# Patient Record
Sex: MALE | Employment: OTHER | URBAN - METROPOLITAN AREA
[De-identification: names, ages, dates, MRNs, and addresses within clinical notes are randomized per-mention and may not be internally consistent; named-entity substitution may affect disease eponyms.]

---

## 2024-10-31 ENCOUNTER — OFFICE VISIT (OUTPATIENT)
Dept: SURGERY | Age: 74
End: 2024-10-31
Payer: MEDICARE

## 2024-10-31 ENCOUNTER — PREP FOR PROCEDURE (OUTPATIENT)
Dept: SURGERY | Age: 74
End: 2024-10-31

## 2024-10-31 VITALS
DIASTOLIC BLOOD PRESSURE: 79 MMHG | HEART RATE: 66 BPM | HEIGHT: 68 IN | BODY MASS INDEX: 25.76 KG/M2 | WEIGHT: 170 LBS | SYSTOLIC BLOOD PRESSURE: 134 MMHG

## 2024-10-31 DIAGNOSIS — L72.3 SEBACEOUS CYST: Primary | ICD-10-CM

## 2024-10-31 DIAGNOSIS — L72.3 SEBACEOUS CYST: ICD-10-CM

## 2024-10-31 PROCEDURE — 1159F MED LIST DOCD IN RCRD: CPT | Performed by: SURGERY

## 2024-10-31 PROCEDURE — G8484 FLU IMMUNIZE NO ADMIN: HCPCS | Performed by: SURGERY

## 2024-10-31 PROCEDURE — 1123F ACP DISCUSS/DSCN MKR DOCD: CPT | Performed by: SURGERY

## 2024-10-31 PROCEDURE — G8419 CALC BMI OUT NRM PARAM NOF/U: HCPCS | Performed by: SURGERY

## 2024-10-31 PROCEDURE — G8427 DOCREV CUR MEDS BY ELIG CLIN: HCPCS | Performed by: SURGERY

## 2024-10-31 PROCEDURE — 99203 OFFICE O/P NEW LOW 30 MIN: CPT | Performed by: SURGERY

## 2024-10-31 PROCEDURE — 1036F TOBACCO NON-USER: CPT | Performed by: SURGERY

## 2024-10-31 PROCEDURE — 3017F COLORECTAL CA SCREEN DOC REV: CPT | Performed by: SURGERY

## 2024-10-31 RX ORDER — METOPROLOL SUCCINATE 25 MG/1
75 TABLET, EXTENDED RELEASE ORAL DAILY
COMMUNITY
Start: 2024-04-03

## 2024-10-31 RX ORDER — ALLOPURINOL 300 MG/1
300 TABLET ORAL DAILY
COMMUNITY
Start: 2024-05-28

## 2024-10-31 RX ORDER — BENZONATATE 200 MG/1
CAPSULE ORAL
COMMUNITY
Start: 2024-07-26

## 2024-10-31 RX ORDER — CETIRIZINE HYDROCHLORIDE 10 MG/1
10 TABLET ORAL DAILY
COMMUNITY
Start: 2024-04-04 | End: 2025-04-04

## 2024-10-31 RX ORDER — AMLODIPINE BESYLATE 10 MG/1
10 TABLET ORAL DAILY
COMMUNITY
Start: 2024-01-17 | End: 2025-01-16

## 2024-10-31 RX ORDER — ASPIRIN 81 MG/1
81 TABLET, CHEWABLE ORAL DAILY
COMMUNITY

## 2024-10-31 RX ORDER — POTASSIUM CHLORIDE 750 MG/1
20 TABLET, EXTENDED RELEASE ORAL DAILY
COMMUNITY
Start: 2024-10-15

## 2024-10-31 RX ORDER — ATORVASTATIN CALCIUM 40 MG/1
40 TABLET, FILM COATED ORAL DAILY
COMMUNITY
Start: 2024-04-03

## 2024-10-31 RX ORDER — OMEPRAZOLE 40 MG/1
40 CAPSULE, DELAYED RELEASE ORAL DAILY
COMMUNITY
Start: 2024-01-17

## 2024-10-31 RX ORDER — LOSARTAN POTASSIUM 100 MG/1
100 TABLET ORAL DAILY
COMMUNITY
Start: 2024-01-17

## 2024-10-31 NOTE — H&P (VIEW-ONLY)
headaches, or seizures.    Psychiatric: No vegetative symptoms or depression.    Endocrine: No heat or cold intolerance, no polyuria or polydipsia.    Hematologic/Lymphatic: No lymphadenopathy, easy bruising or bleeding.    The remainder of the review of symptoms was negative, except as per hpi.    The following portions of the patient's history were reviewed and updated as appropriate: allergies, current medications, past family history, past medical history, past social history, past surgical history and problem list.    PAST MEDICAL HISTORY:    No past medical history on file.    PAST SURGICAL HISTORY:    Past Surgical History:   Procedure Laterality Date    HAND SURGERY      HERNIA REPAIR      PROSTATE SURGERY      SKIN CANCER EXCISION      TONSILLECTOMY         FAMILY HISTORY:    No family history on file.    SOCIAL HISTORY:    Social History     Socioeconomic History    Marital status:      Spouse name: Not on file    Number of children: Not on file    Years of education: Not on file    Highest education level: Not on file   Occupational History    Not on file   Tobacco Use    Smoking status: Never    Smokeless tobacco: Never   Substance and Sexual Activity    Alcohol use: Not on file    Drug use: Not on file    Sexual activity: Not on file   Other Topics Concern    Not on file   Social History Narrative    Not on file     Social Determinants of Health     Financial Resource Strain: Not on file   Food Insecurity: No Food Insecurity (10/2/2024)    Received from [facility]    Hunger Vital Sign     Worried About Running Out of Food in the Last Year: Never true     Ran Out of Food in the Last Year: Never true   Transportation Needs: No Transportation Needs (6/30/2021)    Received from [facility]    PRAPARE - Transportation     Lack of Transportation (Medical): No     Lack of Transportation (Non-Medical): No   Physical Activity: Insufficiently Active (1/22/2024)    Received from [employer]    Physical Activity

## 2024-10-31 NOTE — PROGRESS NOTES
GENERAL SURGERY NEW PATIENT NOTE    PRIMARY CARE PROVIDER:Kalia Patel MD    REFERRING PHYSICIAN: Kellie Gould    Dear Dr. Gould,    I had the pleasure of seeing your patient, Nawaf Baez, in the General Surgery Clinic at [facility]. My findings and recommendations are as follows:    Date of visit: 10/31/24    CHIEF COMPLAINT: Lipoma of forehead    HISTORY OF PRESENT ILLNESS: Nawaf Baez is a 74 y.o. male with history of HTN, HLD, GERD, gout, basal cell skin cancer on forehead status post excision referred to me for evaluation of forehead lipoma.    Patient reports that he first noticed a small lump over his forehead approximately 3 to 4 weeks after he had his basal cell skin cancer removed over his forehead approximately 1 year ago.  He states that the lump occurred near where his prior incision was.  He states that it did not bother him at that time and did not cause any pain, however it is slowly grown over time.  He does state that it is bothersome to him now given its location and size.  However, he states that he does not have any significant pain or drainage with the mass.  Denies any redness over the mass.  Denies any fevers, chills, chest pain, shortness of breath, dizziness, lightheadedness, nausea, vomiting, diarrhea, unintentional weight loss, or night sweats.        Prior Abdominal Surgery: basal cell skin cancer excision, prostatectomy, bilateral inguinal hernia repair  Cardiac History: HTN  Pulmonary History: None  Blood Thinners: [id number]        REVIEW OF SYSTEMS:    Constitutional: no weight loss, no fevers or night sweats    Eyes: No pain, redness or visual changes.    Ears, Nose, Mouth: No oral or nasal ulcers.    Cardiovascular: No recent chest pain, irregular heartbeat, or dizziness.    Respiratory: No cough, pleuritic pain, or dyspnea on exertion    Gastrointestinal: as above.    Genitourinary: No dysuria, hematuria    Neurological: No new weakness, numbness,

## 2024-11-05 RX ORDER — SILDENAFIL CITRATE 20 MG/1
20 TABLET ORAL DAILY
COMMUNITY
Start: 2024-03-04

## 2024-11-05 RX ORDER — METHYLPREDNISOLONE 4 MG
500 TABLET, DOSE PACK ORAL NIGHTLY
COMMUNITY

## 2024-11-05 RX ORDER — ESCITALOPRAM OXALATE 10 MG/1
10 TABLET ORAL DAILY
COMMUNITY

## 2024-11-05 RX ORDER — ASPIRIN 81 MG
1 TABLET, DELAYED RELEASE (ENTERIC COATED) ORAL EVERY MORNING
COMMUNITY

## 2024-11-05 RX ORDER — CHLORTHALIDONE 25 MG/1
25 TABLET ORAL DAILY
COMMUNITY
Start: 2024-10-30

## 2024-11-05 NOTE — PERIOP NOTE
Patient verified name and .  Order for consent NOT found in EHR at time of PAT visit. Unable to verify case posting against order; surgery verified by patient.    Type 1B surgery, PAT phone assessment complete.  Orders not received.  Labs per surgeon: unknown; no orders received    Labs per anesthesia protocol: POC K+ DOS    Patient answered medical/surgical history questions at their best of ability. All prior to admission medications documented in EPIC.    Patient instructed to continue taking all prescription medications up to the day of surgery but to take only the following medications the day of surgery according to anesthesia guidelines with a small sip of water: aspirin 81 mg, Lexapro, Zyrtec, amlodipine, allopurinol, omeprazole. Patient states he takes metoprolol at night. Also, patient is requested to take 2 Tylenol in the morning and then again before bed on the day before surgery. Regular or extra strength may be used.       Patient informed that all vitamins and supplements should be held 7 days prior to surgery and NSAIDS 5 days prior to surgery. Prescription meds to hold:vitamins and supplements 7 days, diclofenac gel 5 days. Do not take chlorthalidone, losartan on the morning of procedure. Hold sildenafil 24 hours PTS (per Dr. Ford).    Patient instructed on the following:    > Arrive at Main Entrance, time of arrival to be called the day before by 1700  > NPO after midnight, unless otherwise indicated, including gum, mints, and ice chips.Per anesthesiology instructions, please drink 32 oz of water 2 hours prior to arrival to prevent dehydration.    > Responsible adult must drive patient to the hospital, stay during surgery, and patient will need supervision 24 hours after anesthesia  > Use non moisturizing soap in shower the night before surgery and on the morning of surgery  > All piercings must be removed prior to arrival.    > Leave all valuables (money and jewelry) at home but bring

## 2024-11-17 ENCOUNTER — ANESTHESIA EVENT (OUTPATIENT)
Dept: SURGERY | Age: 74
End: 2024-11-17
Payer: MEDICARE

## 2024-11-17 RX ORDER — HYDROMORPHONE HYDROCHLORIDE 2 MG/ML
0.5 INJECTION, SOLUTION INTRAMUSCULAR; INTRAVENOUS; SUBCUTANEOUS EVERY 10 MIN PRN
Status: CANCELLED | OUTPATIENT
Start: 2024-11-17

## 2024-11-17 RX ORDER — FENTANYL CITRATE 50 UG/ML
50 INJECTION, SOLUTION INTRAMUSCULAR; INTRAVENOUS EVERY 5 MIN PRN
Status: CANCELLED | OUTPATIENT
Start: 2024-11-17

## 2024-11-17 RX ORDER — HALOPERIDOL 5 MG/ML
1 INJECTION INTRAMUSCULAR
Status: CANCELLED | OUTPATIENT
Start: 2024-11-17 | End: 2024-11-18

## 2024-11-17 RX ORDER — OXYCODONE HYDROCHLORIDE 5 MG/1
5 TABLET ORAL
Status: CANCELLED | OUTPATIENT
Start: 2024-11-17 | End: 2024-11-18

## 2024-11-17 RX ORDER — SODIUM CHLORIDE 0.9 % (FLUSH) 0.9 %
5-40 SYRINGE (ML) INJECTION EVERY 12 HOURS SCHEDULED
Status: CANCELLED | OUTPATIENT
Start: 2024-11-17

## 2024-11-17 RX ORDER — NALOXONE HYDROCHLORIDE 0.4 MG/ML
INJECTION, SOLUTION INTRAMUSCULAR; INTRAVENOUS; SUBCUTANEOUS PRN
Status: CANCELLED | OUTPATIENT
Start: 2024-11-17

## 2024-11-17 RX ORDER — SODIUM CHLORIDE 0.9 % (FLUSH) 0.9 %
5-40 SYRINGE (ML) INJECTION PRN
Status: CANCELLED | OUTPATIENT
Start: 2024-11-17

## 2024-11-17 RX ORDER — IPRATROPIUM BROMIDE AND ALBUTEROL SULFATE 2.5; .5 MG/3ML; MG/3ML
1 SOLUTION RESPIRATORY (INHALATION)
Status: CANCELLED | OUTPATIENT
Start: 2024-11-17 | End: 2024-11-18

## 2024-11-17 RX ORDER — ONDANSETRON 2 MG/ML
4 INJECTION INTRAMUSCULAR; INTRAVENOUS
Status: CANCELLED | OUTPATIENT
Start: 2024-11-17 | End: 2024-11-18

## 2024-11-18 ENCOUNTER — HOSPITAL ENCOUNTER (OUTPATIENT)
Age: 74
Setting detail: OUTPATIENT SURGERY
Discharge: HOME | End: 2024-11-18
Attending: SURGERY | Admitting: SURGERY
Payer: MEDICARE

## 2024-11-18 ENCOUNTER — ANESTHESIA (OUTPATIENT)
Dept: SURGERY | Age: 74
End: 2024-11-18
Payer: MEDICARE

## 2024-11-18 VITALS
DIASTOLIC BLOOD PRESSURE: 75 MMHG | RESPIRATION RATE: 11 BRPM | SYSTOLIC BLOOD PRESSURE: 150 MMHG | TEMPERATURE: 98 F | BODY MASS INDEX: 26.7 KG/M2 | OXYGEN SATURATION: 96 % | HEIGHT: 68 IN | HEART RATE: 57 BPM | WEIGHT: 176.2 LBS

## 2024-11-18 LAB — POTASSIUM BLD-SCNC: 3 MMOL/L (ref 3.5–5.1)

## 2024-11-18 PROCEDURE — 6360000002 HC RX W HCPCS: Performed by: SURGERY

## 2024-11-18 PROCEDURE — 2580000003 HC RX 258: Performed by: SURGERY

## 2024-11-18 PROCEDURE — 88304 TISSUE EXAM BY PATHOLOGIST: CPT

## 2024-11-18 PROCEDURE — 3700000000 HC ANESTHESIA ATTENDED CARE: Performed by: SURGERY

## 2024-11-18 PROCEDURE — 2580000003 HC RX 258: Performed by: STUDENT IN AN ORGANIZED HEALTH CARE EDUCATION/TRAINING PROGRAM

## 2024-11-18 PROCEDURE — 2709999900 HC NON-CHARGEABLE SUPPLY: Performed by: SURGERY

## 2024-11-18 PROCEDURE — 7100000000 HC PACU RECOVERY - FIRST 15 MIN: Performed by: SURGERY

## 2024-11-18 PROCEDURE — 2500000003 HC RX 250 WO HCPCS: Performed by: SURGERY

## 2024-11-18 PROCEDURE — 84132 ASSAY OF SERUM POTASSIUM: CPT

## 2024-11-18 PROCEDURE — 7100000011 HC PHASE II RECOVERY - ADDTL 15 MIN: Performed by: SURGERY

## 2024-11-18 PROCEDURE — 6360000002 HC RX W HCPCS: Performed by: STUDENT IN AN ORGANIZED HEALTH CARE EDUCATION/TRAINING PROGRAM

## 2024-11-18 PROCEDURE — 7100000001 HC PACU RECOVERY - ADDTL 15 MIN: Performed by: SURGERY

## 2024-11-18 PROCEDURE — 3700000001 HC ADD 15 MINUTES (ANESTHESIA): Performed by: SURGERY

## 2024-11-18 PROCEDURE — 2500000003 HC RX 250 WO HCPCS: Performed by: STUDENT IN AN ORGANIZED HEALTH CARE EDUCATION/TRAINING PROGRAM

## 2024-11-18 PROCEDURE — 7100000010 HC PHASE II RECOVERY - FIRST 15 MIN: Performed by: SURGERY

## 2024-11-18 PROCEDURE — 3600000002 HC SURGERY LEVEL 2 BASE: Performed by: SURGERY

## 2024-11-18 PROCEDURE — 3600000012 HC SURGERY LEVEL 2 ADDTL 15MIN: Performed by: SURGERY

## 2024-11-18 RX ORDER — ACETAMINOPHEN 500 MG
1000 TABLET ORAL ONCE
Status: DISCONTINUED | OUTPATIENT
Start: 2024-11-18 | End: 2024-11-18 | Stop reason: HOSPADM

## 2024-11-18 RX ORDER — LIDOCAINE HYDROCHLORIDE 20 MG/ML
INJECTION, SOLUTION EPIDURAL; INFILTRATION; INTRACAUDAL; PERINEURAL
Status: DISCONTINUED | OUTPATIENT
Start: 2024-11-18 | End: 2024-11-18 | Stop reason: SDUPTHER

## 2024-11-18 RX ORDER — SODIUM CHLORIDE 0.9 % (FLUSH) 0.9 %
SYRINGE (ML) INJECTION
Status: DISCONTINUED | OUTPATIENT
Start: 2024-11-18 | End: 2024-11-18 | Stop reason: SDUPTHER

## 2024-11-18 RX ORDER — LIDOCAINE HYDROCHLORIDE 10 MG/ML
1 INJECTION, SOLUTION INFILTRATION; PERINEURAL
Status: DISCONTINUED | OUTPATIENT
Start: 2024-11-18 | End: 2024-11-18 | Stop reason: HOSPADM

## 2024-11-18 RX ORDER — ACETAMINOPHEN 500 MG
500 TABLET ORAL 4 TIMES DAILY PRN
Qty: 360 TABLET | Refills: 1 | Status: SHIPPED | OUTPATIENT
Start: 2024-11-18

## 2024-11-18 RX ORDER — SODIUM CHLORIDE 0.9 % (FLUSH) 0.9 %
5-40 SYRINGE (ML) INJECTION EVERY 12 HOURS SCHEDULED
Status: DISCONTINUED | OUTPATIENT
Start: 2024-11-18 | End: 2024-11-18 | Stop reason: HOSPADM

## 2024-11-18 RX ORDER — BUPIVACAINE HYDROCHLORIDE AND EPINEPHRINE 5; 5 MG/ML; UG/ML
INJECTION, SOLUTION EPIDURAL; INTRACAUDAL; PERINEURAL PRN
Status: DISCONTINUED | OUTPATIENT
Start: 2024-11-18 | End: 2024-11-18 | Stop reason: ALTCHOICE

## 2024-11-18 RX ORDER — MIDAZOLAM HYDROCHLORIDE 2 MG/2ML
2 INJECTION, SOLUTION INTRAMUSCULAR; INTRAVENOUS
Status: DISCONTINUED | OUTPATIENT
Start: 2024-11-18 | End: 2024-11-18 | Stop reason: HOSPADM

## 2024-11-18 RX ORDER — SODIUM CHLORIDE, SODIUM LACTATE, POTASSIUM CHLORIDE, CALCIUM CHLORIDE 600; 310; 30; 20 MG/100ML; MG/100ML; MG/100ML; MG/100ML
INJECTION, SOLUTION INTRAVENOUS CONTINUOUS
Status: DISCONTINUED | OUTPATIENT
Start: 2024-11-18 | End: 2024-11-18 | Stop reason: HOSPADM

## 2024-11-18 RX ORDER — SODIUM CHLORIDE 0.9 % (FLUSH) 0.9 %
5-40 SYRINGE (ML) INJECTION PRN
Status: DISCONTINUED | OUTPATIENT
Start: 2024-11-18 | End: 2024-11-18 | Stop reason: HOSPADM

## 2024-11-18 RX ORDER — PROPOFOL 10 MG/ML
INJECTION, EMULSION INTRAVENOUS
Status: DISCONTINUED | OUTPATIENT
Start: 2024-11-18 | End: 2024-11-18 | Stop reason: SDUPTHER

## 2024-11-18 RX ADMIN — SODIUM CHLORIDE, PRESERVATIVE FREE 20 ML: 5 INJECTION INTRAVENOUS at 09:34

## 2024-11-18 RX ADMIN — PROPOFOL 120 MCG/KG/MIN: 10 INJECTION, EMULSION INTRAVENOUS at 09:35

## 2024-11-18 RX ADMIN — CEFAZOLIN 2000 MG: 2 INJECTION, POWDER, FOR SOLUTION INTRAMUSCULAR; INTRAVENOUS at 09:36

## 2024-11-18 RX ADMIN — SODIUM CHLORIDE, PRESERVATIVE FREE 10 ML: 5 INJECTION INTRAVENOUS at 10:05

## 2024-11-18 RX ADMIN — SODIUM CHLORIDE, PRESERVATIVE FREE 10 ML: 5 INJECTION INTRAVENOUS at 09:36

## 2024-11-18 RX ADMIN — LIDOCAINE HYDROCHLORIDE 40 MG: 20 INJECTION, SOLUTION EPIDURAL; INFILTRATION; INTRACAUDAL; PERINEURAL at 09:48

## 2024-11-18 RX ADMIN — PROPOFOL 50 MG: 10 INJECTION, EMULSION INTRAVENOUS at 09:34

## 2024-11-18 RX ADMIN — LIDOCAINE HYDROCHLORIDE 60 MG: 20 INJECTION, SOLUTION EPIDURAL; INFILTRATION; INTRACAUDAL; PERINEURAL at 09:34

## 2024-11-18 RX ADMIN — PROPOFOL 20 MG: 10 INJECTION, EMULSION INTRAVENOUS at 09:48

## 2024-11-18 ASSESSMENT — PAIN - FUNCTIONAL ASSESSMENT
PAIN_FUNCTIONAL_ASSESSMENT: NONE - DENIES PAIN
PAIN_FUNCTIONAL_ASSESSMENT: 0-10
PAIN_FUNCTIONAL_ASSESSMENT: NONE - DENIES PAIN
PAIN_FUNCTIONAL_ASSESSMENT: NONE - DENIES PAIN

## 2024-11-18 NOTE — ANESTHESIA PRE PROCEDURE
tablet by mouth daily   Yes Provider, MD Roel   diclofenac sodium (VOLTAREN) 1 % GEL Apply topically as needed    Provider, MD Roel       Current medications:    Current Facility-Administered Medications   Medication Dose Route Frequency Provider Last Rate Last Admin   • lidocaine 1 % injection 1 mL  1 mL IntraDERmal Once PRN Moe Guzman MD       • acetaminophen (TYLENOL) tablet 1,000 mg  1,000 mg Oral Once Moe Guzman MD       • famotidine (PEPCID) 20 mg in sodium chloride (PF) 0.9 % 10 mL injection  20 mg IntraVENous Once PRN Moe Guzman MD       • lactated ringers infusion   IntraVENous Continuous Moe Guzman MD       • sodium chloride flush 0.9 % injection 5-40 mL  5-40 mL IntraVENous 2 times per day Moe Guzman MD       • sodium chloride flush 0.9 % injection 5-40 mL  5-40 mL IntraVENous PRN Moe Guzman MD       • midazolam PF (VERSED) injection 2 mg  2 mg IntraVENous Once PRN Moe Guzman MD           Allergies:    Allergies   Allergen Reactions   • Amoxicillin Hives   • Seasonal    • Adhesive Tape Rash   • Gabapentin Rash       Problem List:    Patient Active Problem List   Diagnosis Code   • Sebaceous cyst L72.3       Past Medical History:        Diagnosis Date   • Acid reflux     managed with medication   • Arthritis    • CAD (coronary artery disease)     denies any history of cardiac stents or MI   • Depression     managed with medication   • Gout    • Hearing loss    • Hiatal hernia     small per patient   • History of basal cell carcinoma    • History of prostate cancer    • History of sleep apnea     resolved after surgery   • Hypercholesteremia    • Hypertension     managed with medication   • Left bundle branch block        Past Surgical History:        Procedure Laterality Date   • FOOT SURGERY Right    • HAND SURGERY     • HERNIA REPAIR      X2   • PROSTATE SURGERY     • SKIN CANCER EXCISION     • TONSILLECTOMY      part of the sleep apnea surgery

## 2024-11-18 NOTE — OP NOTE
Operative Note      Patient: Nawaf Baez III  YOB: 1950  MRN: [id number]    Date of Procedure: 11/18/2024    Pre-Op Diagnosis Codes:      * Sebaceous cyst [L72.3]    Post-Op Diagnosis:  Left forehead lipoma measuring 1 cm x 0.5 cm x 0.5 cm in size       Procedure(s):  EXCISION FOREHEAD SEBACEOUS CYST    Surgeon(s):  Ryan Austin MD    Assistant:   * No surgical staff found *    Anesthesia: Monitor Anesthesia Care    Indication: Nawaf Baez III is a 74 y.o. male with history of HTN, HLD, GERD, gout, basal cell skin cancer on forehead status post excision who now presents with bothersome left forehead sebaceous cyst. Therefore, patient was taken to the OR for left forehead sebaceous cyst excision.  Discussed all risks, benefits, and alternatives to the procedure including, but not limited to, the risk of bleeding, infection, damage to surrounding structures, recurrence, MI, CVA, DVT/PE, possible conversion to open procedure, and need for additional procedures.  Patient wishes to proceed with the planned procedure.  Informed consent was obtained.      Procedure Details: Pre-operative consent was obtained and documented in the record. The patient was brought to the operating room and placed in supine position. The skin was prepped using chlorhexidine scrub and sterile drapes were applied. A surgical timeout was performed per operating room regulations.    Local anesthetic was infiltrated using 0.5% Marcaine with epinephrine at the planned site of incision.  A Linear incision was made overlying the left forehead sebaceous cyst using a 15 blade scalpel.  Dissection was carried down through the skin subcutaneous tissue using Bovie electrocautery.  The mass was noted to be a lipoma rather than a sebaceous cyst.  An Allis clamp was used to grab the lipoma and Bovie electrocautery was used to carefully encircled the sebaceous cyst to excise it from the surrounding adipose tissue.

## 2024-11-18 NOTE — INTERVAL H&P NOTE
Update History & Physical    The patient's History and Physical of October 31, 2024 was reviewed with the patient and I examined the patient. There was no change. The surgical site was confirmed by the patient and me.     Plan: The risks, benefits, expected outcome, and alternative to the recommended procedure have been discussed with the patient. Patient understands and wants to proceed with the procedure.     Electronically signed by Ryan Austin MD on 11/18/2024 at 9:13 AM

## 2024-11-18 NOTE — ANESTHESIA POSTPROCEDURE EVALUATION
Department of Anesthesiology  Postprocedure Note    Patient: Nawaf Baez III  MRN: [id number]  YOB: 1950  Date of evaluation: 11/18/2024    Procedure Summary       Date: 11/18/24 Room / Location: [facility] MAIN OR  / [facility] MAIN OR    Anesthesia Start: 0928 Anesthesia Stop: 1017    Procedure: EXCISION FOREHEAD SEBACEOUS CYST (Head) Diagnosis:       Sebaceous cyst      (Sebaceous cyst [L72.3])    Providers: Ryan Austin MD Responsible Provider: Manny Westfall Jr., MD    Anesthesia Type: TIVA ASA Status: 3            Anesthesia Type: TIVA    Shannon Phase I: Shannon Score: 8    Shannon Phase II:      Anesthesia Post Evaluation    Patient location during evaluation: PACU  Patient participation: complete - patient participated  Level of consciousness: awake  Pain score: 0  Airway patency: patent  Nausea & Vomiting: no nausea and no vomiting  Cardiovascular status: blood pressure returned to baseline and hemodynamically stable  Respiratory status: acceptable, spontaneous ventilation and nonlabored ventilation  Hydration status: euvolemic  Multimodal analgesia pain management approach  Pain management: adequate    No notable events documented.

## 2024-11-18 NOTE — DISCHARGE INSTRUCTIONS
Soft Tissue Mass or Cyst Excision    Activity  As tolerated and as directed by your doctor.  Bathe or shower as directed by your doctor    Diet  Clear liquids until no nausea or vomiting; then light diet for hte first day.  Advance to regular diet on the second day unless your doctor orders otherwise.  If nausea and vomiting continue, call your doctor.    Pain  Take pain medication as directed by your doctor.  Call your doctor if your pain is NOT relieved by medication.  DO NOT take aspirin or blood thinners until directed by your doctor.    If you have any problems or concerns, call your doctor as needed or if you experience;  Excessive bleeding that does not stop after holding mild pressure over the area for several minutes.  Temperature of 101 degrees Fahrenheit or above.  Redness, excessive swelling or bruising, and/or green or yellow, smelly discharge from incision.    After general anesthesia or intravenous sedation, for 24 hours or while taking prescription Narcotics:  Limit your activities  A responsible adult needs to be with you for the next 24 hours  Do not drive and operate hazardous machinery  Do not make important personal or business decisions  Do not drink alcoholic beverages  If you have not urinated within 8 hours after discharge, and you are experiencing discomfort from urinary retention, please go to the nearest ED.  If you have sleep apnea and have a CPAP machine, please use it for all naps and sleeping.  Please use caution when taking narcotics and any of your home medications that may cause drowsiness.  *  Please give a list of your current medications to your Primary Care Provider.  *  Please update this list whenever your medications are discontinued, doses are      changed, or new medications (including over-the-counter products) are added.  *  Please carry medication information at all times in case of emergency situations.    These are general instructions for a healthy lifestyle:  No

## 2024-11-26 NOTE — PROGRESS NOTES
[facility]  [address]  [place], [place] [address] [phone]      Nawaf Baez III   presents for follow-up after EXCISION FOREHEAD SEBACEOUS CYST   by Dr Austin 11/18/24.  He reports the glue peeled off the wound early and now has dry scab.    EXAM:  He  is in no distress  Incision: healing with dry granulation, skin margins open approx 5mm entire length. No cellulitis    Pathology: Soft tissue, forehead, excision:   - Lipoma.       A copy was provided to the patient.       ASSESSMENT/PLAN:    1. Sebaceous cyst       I'll have him back in 4 weeks to ensure the wound has healed. He will call if there are any new concerns.      No follow-up provider specified.    TANISHA SIMON, KELSY - CNP

## 2024-11-27 ENCOUNTER — OFFICE VISIT (OUTPATIENT)
Dept: SURGERY | Age: 74
End: 2024-11-27

## 2024-11-27 VITALS — HEIGHT: 68 IN | WEIGHT: 176 LBS | BODY MASS INDEX: 26.67 KG/M2

## 2024-11-27 DIAGNOSIS — L72.3 SEBACEOUS CYST: Primary | ICD-10-CM

## 2024-12-26 ENCOUNTER — OFFICE VISIT (OUTPATIENT)
Dept: SURGERY | Age: 74
End: 2024-12-26
Payer: MEDICARE

## 2024-12-26 VITALS
HEIGHT: 68 IN | HEART RATE: 64 BPM | BODY MASS INDEX: 26.52 KG/M2 | SYSTOLIC BLOOD PRESSURE: 147 MMHG | DIASTOLIC BLOOD PRESSURE: 82 MMHG | WEIGHT: 175 LBS

## 2024-12-26 DIAGNOSIS — Z86.018 STATUS POST EXCISION OF LIPOMA: Primary | ICD-10-CM

## 2024-12-26 DIAGNOSIS — Z98.890 STATUS POST EXCISION OF LIPOMA: Primary | ICD-10-CM

## 2024-12-26 PROCEDURE — 1036F TOBACCO NON-USER: CPT | Performed by: SURGERY

## 2024-12-26 PROCEDURE — G8484 FLU IMMUNIZE NO ADMIN: HCPCS | Performed by: SURGERY

## 2024-12-26 PROCEDURE — 99213 OFFICE O/P EST LOW 20 MIN: CPT | Performed by: SURGERY

## 2024-12-26 PROCEDURE — 1159F MED LIST DOCD IN RCRD: CPT | Performed by: SURGERY

## 2024-12-26 PROCEDURE — 1123F ACP DISCUSS/DSCN MKR DOCD: CPT | Performed by: SURGERY

## 2024-12-26 PROCEDURE — G8427 DOCREV CUR MEDS BY ELIG CLIN: HCPCS | Performed by: SURGERY

## 2024-12-26 PROCEDURE — G8419 CALC BMI OUT NRM PARAM NOF/U: HCPCS | Performed by: SURGERY

## 2024-12-26 PROCEDURE — 3017F COLORECTAL CA SCREEN DOC REV: CPT | Performed by: SURGERY

## 2024-12-26 NOTE — PROGRESS NOTES
GENERAL SURGERY POSTOPERATIVE FOLLOW UP NOTE:      HISTORY OF PRESENT ILLNESS: Nawaf Baez III is a 74 y.o. male s/p excision of forehead lipoma 11/19/2024 who presents for 1 month follow up.    Patient reports that approximately 1 week after the surgery the Dermabond peeled off and the incision opened a small amount.  He reports that the incision continues to drain a small amount of fluid, but denies any purulent drainage or redness around the incision.  He states that the incision continues to heal and is now scabbed over.  Denies any continued drainage from the incision.  Denies any fevers, chills, chest pain, shortness of breath, dizziness, lightheadedness, nausea, vomiting, diarrhea, or other changes in bowel habits.    Pathology reviewed with patient - lipoma    REVIEW OF SYSTEMS:   10 Point ROS negative except what is in HPI    PAST MEDICAL HISTORY:    Past Medical History:   Diagnosis Date    Acid reflux     managed with medication    Arthritis     CAD (coronary artery disease)     denies any history of cardiac stents or MI    Depression     managed with medication    Gout     Hearing loss     Hiatal hernia     small per patient    History of basal cell carcinoma     History of prostate cancer     History of sleep apnea     resolved after surgery    Hypercholesteremia     Hypertension     managed with medication    Left bundle branch block        PAST SURGICAL HISTORY:    Past Surgical History:   Procedure Laterality Date    FACIAL SURGERY N/A 11/18/2024    EXCISION FOREHEAD SEBACEOUS CYST performed by Ryan Austin MD at [facility] MAIN OR    FOOT SURGERY Right     HAND SURGERY      HERNIA REPAIR      X2    PROSTATE SURGERY      SKIN CANCER EXCISION      TONSILLECTOMY      part of the sleep apnea surgery       FAMILY HISTORY:    No family history on file.    SOCIAL HISTORY:    Social History     Socioeconomic History    Marital status:      Spouse name: Not on file    Number of

## (undated) DEVICE — GARMENT,MEDLINE,DVT,INT,CALF,MED, GEN2: Brand: MEDLINE

## (undated) DEVICE — 3M™ TEGADERM™ TRANSPARENT FILM DRESSING FRAME STYLE, 1626W, 4 IN X 4-3/4 IN (10 CM X 12 CM), 50/CT 4CT/CASE: Brand: 3M™ TEGADERM™

## (undated) DEVICE — MINOR SPLIT GENERAL: Brand: MEDLINE INDUSTRIES, INC.

## (undated) DEVICE — SUTURE VICRYL + SZ 3-0 L27IN ABSRB UD L26MM SH 1/2 CIR VCP416H

## (undated) DEVICE — DRAPE TOWEL: Brand: CONVERTORS

## (undated) DEVICE — PAD,NON-ADHERENT,3X8,STERILE,LF,1/PK: Brand: MEDLINE

## (undated) DEVICE — SUTURE MONOCRYL SZ 4-0 L27IN ABSRB UD L19MM PS-2 1/2 CIR PRIM Y426H

## (undated) DEVICE — NEPTUNE E-SEP SMOKE EVACUATION PENCIL, COATED, 70MM BLADE, PUSH BUTTON SWITCH: Brand: NEPTUNE E-SEP

## (undated) DEVICE — GLOVE SURG SZ 75 L12IN FNGR THK79MIL GRN LTX FREE

## (undated) DEVICE — NEEDLE HYPO 21GA L1.5IN INTRAMUSCULAR S STL LATCH BVL UP

## (undated) DEVICE — GLOVE ORANGE PI 7   MSG9070